# Patient Record
Sex: FEMALE | Race: OTHER | URBAN - METROPOLITAN AREA
[De-identification: names, ages, dates, MRNs, and addresses within clinical notes are randomized per-mention and may not be internally consistent; named-entity substitution may affect disease eponyms.]

---

## 2020-02-20 ENCOUNTER — OFFICE VISIT (OUTPATIENT)
Dept: FAMILY MEDICINE CLINIC | Age: 25
End: 2020-02-20

## 2020-02-20 VITALS
DIASTOLIC BLOOD PRESSURE: 80 MMHG | BODY MASS INDEX: 23.95 KG/M2 | TEMPERATURE: 99.1 F | HEART RATE: 75 BPM | OXYGEN SATURATION: 98 % | HEIGHT: 59 IN | SYSTOLIC BLOOD PRESSURE: 136 MMHG | WEIGHT: 118.8 LBS

## 2020-02-20 DIAGNOSIS — J06.9 UPPER RESPIRATORY TRACT INFECTION, UNSPECIFIED TYPE: Primary | ICD-10-CM

## 2020-02-20 RX ORDER — BENZONATATE 200 MG/1
200 CAPSULE ORAL
Qty: 15 CAP | Refills: 0 | Status: SHIPPED | OUTPATIENT
Start: 2020-02-20 | End: 2020-02-25

## 2020-02-20 NOTE — PROGRESS NOTES
Printed AVS, provided to patient and reviewed. Ordered medication reviewed with pt and coupon given. Advised pt to return to clinic if symptoms worsen or fail to improve. All questions answered and pt verbalized understanding. Rema Hashimoto interpreted for this encounter.  Den Barahona RN

## 2020-02-20 NOTE — PROGRESS NOTES
HISTORY OF PRESENT ILLNESS  Iris TISH Mathur is a 25 y.o. female. HPI  Cough, congestion and headaches, has had some pain around the eyes. Has been this way for 4 days. She had some bodyaches Monday. No nausea. No vomiting, no diarrhea. Her daughter was diagnosed with a cold last week. Has not taken any medications so far. No allergies  Review of Systems   Constitutional: Negative for chills and fever. Respiratory: Positive for cough. Negative for shortness of breath and wheezing. Cardiovascular: Negative for chest pain and palpitations. Gastrointestinal: Negative for heartburn, nausea and vomiting. Genitourinary: Negative for dysuria and urgency. Neurological: Positive for headaches. /80 (BP 1 Location: Right arm, BP Patient Position: Sitting)   Pulse 75   Temp 99.1 °F (37.3 °C) (Oral)   Ht 4' 11.06\" (1.5 m)   Wt 118 lb 12.8 oz (53.9 kg)   SpO2 98%   BMI 23.95 kg/m²   Physical Exam  HENT:      Head: Normocephalic. Right Ear: Tympanic membrane normal. There is no impacted cerumen. Left Ear: Tympanic membrane normal. There is no impacted cerumen. Nose: Nose normal. No congestion or rhinorrhea. Mouth/Throat:      Mouth: Mucous membranes are moist.      Pharynx: No oropharyngeal exudate or posterior oropharyngeal erythema. Eyes:      Conjunctiva/sclera: Conjunctivae normal.      Pupils: Pupils are equal, round, and reactive to light. Neck:      Musculoskeletal: Normal range of motion. No neck rigidity. Cardiovascular:      Rate and Rhythm: Normal rate. Pulses: Normal pulses. Heart sounds: Normal heart sounds. No murmur. Neurological:      Mental Status: She is alert. ASSESSMENT and PLAN  Diagnoses and all orders for this visit:    1. Upper respiratory tract infection, unspecified type  -     benzonatate (TESSALON) 200 mg capsule; Take 1 Cap by mouth three (3) times daily as needed for Cough for up to 5 days.       URI, viral, needs to rest and take plenty of fluids